# Patient Record
Sex: FEMALE | Race: WHITE | NOT HISPANIC OR LATINO | Employment: OTHER | URBAN - METROPOLITAN AREA
[De-identification: names, ages, dates, MRNs, and addresses within clinical notes are randomized per-mention and may not be internally consistent; named-entity substitution may affect disease eponyms.]

---

## 2019-10-04 NOTE — TELEPHONE ENCOUNTER
RECORDS RECEIVED FROM: External - Dr. Dalton Rinaldi at [facility]    DATE RECEIVED: 1/17/20   NOTES (FOR ALL VISITS) STATUS DETAILS   OFFICE NOTE from referring provider Care Everywhere 9/20/19  6/17/19  3/20/19  (additional encounters in Care Everywhere)   OFFICE NOTE from other specialist N/A    DISCHARGE SUMMARY from hospital N/A    DISCHARGE REPORT from the ER N/A    OPERATIVE REPORT N/A    MEDICATION LIST Care Everywhere    IMAGING  (FOR ALL VISITS)     EMG N/A    EEG N/A    ECT N/A    MRI (HEAD, NECK, SPINE) N/A    LUMBAR PUNCTURE N/A    AMY Scan N/A    CT (HEAD, NECK, SPINE) N/A

## 2019-11-18 ENCOUNTER — DOCUMENTATION ONLY (OUTPATIENT)
Dept: CARE COORDINATION | Facility: CLINIC | Age: 59
End: 2019-11-18

## 2020-01-17 ENCOUNTER — PRE VISIT (OUTPATIENT)
Dept: NEUROLOGY | Facility: CLINIC | Age: 60
End: 2020-01-17

## 2020-07-17 ENCOUNTER — OFFICE VISIT (OUTPATIENT)
Dept: NEUROLOGY | Facility: CLINIC | Age: 60
End: 2020-07-17
Payer: COMMERCIAL

## 2020-07-17 VITALS
SYSTOLIC BLOOD PRESSURE: 122 MMHG | DIASTOLIC BLOOD PRESSURE: 75 MMHG | HEIGHT: 64 IN | OXYGEN SATURATION: 97 % | BODY MASS INDEX: 22.42 KG/M2 | WEIGHT: 131.3 LBS | HEART RATE: 60 BPM | RESPIRATION RATE: 16 BRPM

## 2020-07-17 DIAGNOSIS — R25.1 TREMOR: Primary | ICD-10-CM

## 2020-07-17 RX ORDER — PROPRANOLOL HYDROCHLORIDE 120 MG/1
240 CAPSULE, EXTENDED RELEASE ORAL
COMMUNITY
Start: 2019-09-20

## 2020-07-17 ASSESSMENT — ACTIVITIES OF DAILY LIVING (ADL)
WRITING: (3) MODERATELY ABNORMAL. CANNOT WRITE WITHOUT USING STRATEGIES SUCH AS HOLDING THE WRITING HAND WITH THE OTHER HAND, HOLDING PEN DIFFERENTLY OR USING LARGE PEN.
DRESS: (0) NORMAL
SOCIAL_IMPACT: (4) AVOIDS PARTICIPATING IN MOST SOCIAL SITUATIONS OR PROFESSIONAL MEETINGS BECAUSE OF TREMOR.
WORKING: (3) MODERATELY ABNORMAL. UNABLE TO CONTINUE WORKING WITHOUT USING STRATEGIES SUCH AS CHANGING JOBS OR USING SPECIAL EQUIPMENT.
POURING: (0) NORMAL
SPEAKING: (0) NORMAL
CARRYING_FOOD_TRAYS_PLATES_OR_SIMILAR_ITEMS: (3) MODERATELY ABNORMAL. USES STRATEGIES SUCH AS HOLDING TIGHTLY AGAINST BODY TO CARRY.
HYGIENE: (3) MODERATELY ABNORMAL. UNABLE TO DO MOST FINE TASKS SUCH AS PUTTING ON LIPSTICK OR SHAVING UNLESS CHANGES STRATEGY SUCH AS USING TWO HANDS OR USING THE LESS AFFECTED HAND.
OVERALL_DISABILITY_WITH_THE_MOST_AFFECTED_TASK: (3) MODERATELY ABNORMAL. CAN DO TASK BUT MUST USE STRATEGIES.
OVERALL_DISABILITY_WITH_THE_MOST_AFFECTED_TASK: TYPING
FEEDING_WITH_A_SPOON: (0) NORMAL
DRINKING_FROM_A_GLASS: (0) NORMAL
USING_KEYS: (0) NORMAL

## 2020-07-17 ASSESSMENT — MIFFLIN-ST. JEOR: SCORE: 1150.57

## 2020-07-17 ASSESSMENT — PAIN SCALES - GENERAL: PAINLEVEL: NO PAIN (0)

## 2020-07-17 NOTE — PATIENT INSTRUCTIONS
We discussed about the character of your tremor and the implications on the treatment .    We noted that you have dystonic tremor . Dystonia is a symptom and is very common in adult population especially when it starts in the upper part of the body ( head and right hand).    We recommend botulinum toxin injections for dystonia which selectively weakness the muscles , botulinum toxin injections can be effective in the treatment of dystonia.    Oral medications are not very promising due to side effects.    Another treatment option is brain surgery- deep brain stimulation.    We recommend trying conservative treatment first before making any suggestions about deep brain stimulation.    You agreed to proceed with botulinum toxin injections, will refer to be seen and evaluated by Dr. Santoyo.

## 2020-07-17 NOTE — NURSING NOTE
Chief Complaint   Patient presents with     DBS Evaluation     [facility] DEEP BRAIN STIMULATION CONSULT        Roderick Landry, EMT

## 2020-07-17 NOTE — PROGRESS NOTES
Department of Neurology  Movement Disorders Division     Patient: Concha Chowdhury   MRN: [id number]   : 1960   Date of Visit: 2020     Referring Physician: LUIS FERNANDO Murrell    Dear Colleague,     Thank you for referring your patient, Ms. Chowdhury, to the [facility] NEUROLOGY at [facility]. Please see a copy of my visit note below.    Reason for visit: tremors     History of Present Illness  Ms. Chowdhury is a 60 year old R handed female with a 5-10 year history of head tremor and right hand tremor.  Head tremor started 5-10 years ago after lifting a heavy box, neck evaluation with C3-C4 bulging disc , then a few years later she noted right hand tremor and she is disabled by the hand tremor that is  getting progressively worse . She states Propranolol is helping with the head tremor and she is pleased with the head tremor control.     She is a  and feels disabled by the right hand  tremor, unable to type, handwrite. She has difficulty while using an eye liner, applying make up, eye brow plucking , using a knife.  She is denying voice change, no gait changes, no memory changes . She is not consuming alcohol , so unable to identify if etoh use has any implications on the tremor.    She is denying resting tremor, she does not have difficulty using a fork, does not eat soup , no difficulty drinking from a cup.     Meds tried: nadolol, acetazolamide, propranolol LA , primidone (unable  to recall SE) .Acetazolamide was working a little bit ,then not working anymore , unable to recall SE .    PMH: Crohns, Depression   Surgery Hx: cataract   Social Hx: no drinking, , no drugs , smoke- 1 PPD for 18 years   FHx: mother - tremor , did not go to the doctor, mother had an action tremor, concha does not have children , she has 2 living bro and 2 living sisters -with  no tremors .    Review of Systems:  Other than that mentioned above, the remainder of 12 systems  reviewed were negative.  .  TRG Essential Tremor Rating Assessment Scale (TETRAS)    Activities of Daily Living Subscale:  1.  Speakin   2.  Feeding with a spoon: 0   3.  Drinking from a glass: 0   4.  Hygiene:make up , plucking eye brows,  Had to rest hand on something and use the other hand  3   5.  Dressin   6.  Pourin   7.  Carrying food trays, plates, or similar items:carrying coffee cups  3   8.  Using Keys: 0   9.  Writing:tremulous , has to stabilize with the other hand  3   10.  Working:   3   11.  Overall disability with most affected task:    Task:  Typing  3   12.  Social Impact: pretty much stays home, noted depression  4     Total for ADL Subscale:   (48 Max) 19     Tremor ADL Scale (from flowsheet)  1. Speaking 0   2. Feeding with a spoon 0   3. Drinking from a glass 0   4. Hygiene 3(Plucking eyebrows rests hand on s/thing, uses other hand.)   5. Dressing 0   6. Pouring 0   7. Carrying food trays, plates or similar items 3   8. Using keys 0   9. Writing 3   10. Working 3   11. Overall disability with the most affected task 3   Name of most affected task Typing   12. Social impact 4   ADL TOTAL           Medications:  Current Outpatient Medications   Medication Sig Dispense Refill     Calcium Carbonate-Vitamin D (CALCIUM + D) 600-200 MG-UNIT per tablet Take 2 tablets by mouth daily.       Cholecalciferol (VITAMIN D3) 1000 UNIT TABS Take  by mouth daily.       cyanocolbalamin (VITAMIN B-12) 500 MCG tablet Take 500 mcg by mouth daily.       sertraline (ZOLOFT) 100 MG tablet Take 100 mg by mouth daily. 1 and 1/2 tablets (150 mg total) daily             Movement Disorder-related Medications                   8 am       Propranolol  mg  2       Zoloft 100 mg    1                         Last taken at:  yesterday at 8 am .           Allergies: is allergic to naprosyn [naproxen] and penicillin g.    Past Medical History:   Past Medical History:   Diagnosis Date     Ringing in left  ear        Past Surgical History:   Past Surgical History:   Procedure Laterality Date     ECTOPIC PREGNANCY SURGERY       EXTRACAPSULAR CATARACT EXTRATION WITH INTRAOCULAR LENS IMPLANT      bilateral     HERNIA REPAIR       MYRINGOTOMY, INSERT TUBE, COMBINED  6/2/2011    Procedure:COMBINED MYRINGOTOMY, INSERT TUBE; Surgeon:ARYAN SANTOS; Location:[facility] OR     RECESSION RESECTION (REPAIR STRABISMUS) BILATERAL      2 or 3 surgeries     STAPEDECTOMY  6/2/2011    Procedure:STAPEDECTOMY; Surgeon:ARYAN SANTOS; Location:[facility] OR       Social History:   Social History     Socioeconomic History     Marital status:      Spouse name: Not on file     Number of children: Not on file     Years of education: Not on file     Highest education level: Not on file   Occupational History     Not on file   Social Needs     Financial resource strain: Not on file     Food insecurity     Worry: Not on file     Inability: Not on file     Transportation needs     Medical: Not on file     Non-medical: Not on file   Tobacco Use     Smoking status: Current Every Day Smoker     Packs/day: 1.00   Substance and Sexual Activity     Alcohol use: No     Drug use: No     Sexual activity: Not on file   Lifestyle     Physical activity     Days per week: Not on file     Minutes per session: Not on file     Stress: Not on file   Relationships     Social connections     Talks on phone: Not on file     Gets together: Not on file     Attends [employer] service: Not on file     Active member of club or organization: Not on file     Attends meetings of clubs or organizations: Not on file     Relationship status: Not on file     Intimate partner violence     Fear of current or ex partner: Not on file     Emotionally abused: Not on file     Physically abused: Not on file     Forced sexual activity: Not on file   Other Topics Concern     Not on file   Social History Narrative     Not on file       Family History:  No family history on  "file.      Physical Exam:  /75   Pulse 60   Resp 16   Ht 1.626 m (5' 4\")   Wt 59.6 kg (131 lb 4.8 oz)   SpO2 97%   BMI 22.54 kg/m     She is alert and oriented, speech is clear, no voice tremor . Follows directions. Registered and recalled 3/3 words . No agraphesthesia.   EOMI in all directions of gaze, face is symmetric at rest and with equal activation.  Neck : left latero antonio ~ 10-20 degrees, yes-yes head tremor.  Muscle tone is normal in all extremities. No resting tremor.   There is no pronator drift. There is right hand posturing with flexed fingers  with arms outstretched .  There is postural tremor with the right hand in the 45 degrees pronation, barely visible BL tremor in wing beating position, there is BL tremor with FTN at the end of the work space.  Interruptions with finger tapping with no decrease in amplitude.     Motor (Performance) Sub-Scale 7/17/2020   Assessment Time 8:38 AM   Medication Off   DBS - Right Brain None   DBS - Left Brain None   Head 1.5   Face & Jaw 0   Voice 1   Outstretched - RIGHT 1.5   Outstretched - LEFT 1   Wingbeating - RIGHT 1   Wingbeating - LEFT 1   Kinetic - RIGHT 1.5   Kinetic - LEFT 1.5   Lower Limb - RIGHT 0   Lower Limb - LEFT 0   Lower Limb (Max) 0   Spiral - RIGHT 3   Spiral - LEFT 1.5   Handwriting 2   Dot approx - RIGHT 1.5   Dot approx - LEFT 1   Trunk (Standing) 0   Total Right 8.5   Total Left 6   Axial 2.5   TOTAL 19     Writing: there is wrist extension and right thumb extension while writing.  Samples of handwriting: right hand with \"geste antagoniste\" .  She has less posturing while holding the pen in the\" awkward positions\" that we asked her to:     Quiet asymmetrialc and tremulous spiral with the right hand. She became very emotional while drawing the right spiral.    Gait: unremarkable , except with decreased arm swing on the right       Impression:  Concha Chowdhury is a 60 year old female, ,  with a 5-10 year history of head " and bilateral hands tremor R>L. She is disabled but he right hand tremor not being able to type, write , use a knife.   Exam with mild latero antonio, dystonic yes-yes head tremor and right hand with posturing while writing.     1. Cervical dystonia with dystonic tremor.  2. Right hand dystonic tremor.     Recommendations:     We discussed about the character of your tremor and the implications on the treatment .    We noted that you have dystonic tremor . Dystonia is a symptom and is very common in adult population especially when it starts in the upper part of the body ( head and right hand).    We recommend botulinum toxin injections for dystonia which selectively weakness the muscles , botulinum toxin injections can be effective in the treatment of dystonia.    Oral medications are not very promising due to side effects.    Another treatment option is brain surgery- deep brain stimulation.    We recommend trying conservative treatment first before making any suggestions about deep brain stimulation.    You agreed to proceed with botulinum toxin injections, will refer to be seen and evaluated by Dr. Santoyo.      Orly Ford MD  Movement Disorders Fellow    Patient seen and examined with Dr. Ford and I agree with the assessment and plan as outlined.  Briefly:  59 yo RHF, wanting further treatment for tremor.  Onset several years ago of head tremor, following a neck injury (lifting heavy load, cervical disk bulge, residual pain).  Improved with propanolol, but recently has been worsening.  Also recently (past few years) has developed a RUE tremor which is more bothersome, and more disabling than the head tremor.  Interferes quite a lot with writing (very problematic, as she's a ), typing, and cosmetics (plucking eyebrows), somewhat with eating. (Movements described by her as jerky, occur when loading the fork, but getting fork to mouth is not particularly difficult.  She does not have occasion to try  soup with a spoon).  Doesn't drink EtOH.  Mother reportedly had tremor, thought to have been PD.  On exam, spontaneous and voluntary facial movements unremarkable,  mild, but convincing L head tilt, mild nodding head tremor, more in midposition than with left or right rotation.  No true resting tremor, but with hand in lap, but semisupinated, and with reinforcement, a little distal RUE tremor was seen.  Very little postural tremor in either hand, with outstreatche or in wingbeating position, though there was a moderate kinetic tremor R>L UE more conspicuous at the edge of the workspace.  Left dot-approximation and spiral were good, but with RUE very impaired, dot approximation had a slightly jerky quality, but the spiral was definitely tremulous (seemed mostly wrist pron/ation supination, or possibly wrist flexion/extension).  Writing in her usual way (right hand robb , her  was tight, and there appeared to be some posturing (wrist extension, thumb extension, at both joints).  She then demonstrated what may be a geste antagoniste, resting 2nd & 3rd left hand fingertips on the right thenar eminence & volar wrist, and with this the handwriting was a bit improved, the posturing more convincingly so.  We tried writing with several other types of , and results were ambiguous, though I do think the power  was better (sloppy handwriting, but looser, and with less posturing).  Impression / Discussion / Plan:  While elements of ET are present, there appears to be a dystonic component to her tremor, which opens up a possible therapy, namely botulinum toxin injections.  Surgery (likely DBS) is also a consideration;  we discussed this, and both of us agreed on a plan to try botulinum toxin first, consider DBS if that's unsuccessful.    Allan Desir PhD, MD

## 2020-09-23 NOTE — TELEPHONE ENCOUNTER
RECORDS RECEIVED FROM: Internal   Date of Appt: 11.10.20   NOTES (FOR ALL VISITS) STATUS DETAILS   OFFICE NOTE from referring provider Internal 7.17.20 [facility] Neuro   OFFICE NOTE from other specialist Internal María Olivares  9.20.19  6.17.19  3.20.19   DISCHARGE SUMMARY from hospital N/A    DISCHARGE REPORT from the ER N/A    OPERATIVE REPORT N/A    MEDICATION LIST Internal    IMAGING  (FOR ALL VISITS)     EMG N/A    EEG N/A    LUMBAR PUNCTURE N/A    AMY SCAN N/A    DEXA SCAN *NEUROSURGERY* N/A    ULTRASOUND (CAROTID BILAT) *VASCULAR* N/A    MRI (HEAD, NECK, SPINE) N/A    XRAY (SPINE) *NEUROSURGERY* N/A    CT (HEAD, NECK, SPINE) N/A

## 2020-11-10 ENCOUNTER — OFFICE VISIT (OUTPATIENT)
Dept: NEUROLOGY | Facility: CLINIC | Age: 60
End: 2020-11-10
Payer: COMMERCIAL

## 2020-11-10 ENCOUNTER — PRE VISIT (OUTPATIENT)
Dept: NEUROLOGY | Facility: CLINIC | Age: 60
End: 2020-11-10

## 2020-11-10 VITALS
OXYGEN SATURATION: 98 % | BODY MASS INDEX: 22.66 KG/M2 | WEIGHT: 132 LBS | HEART RATE: 63 BPM | SYSTOLIC BLOOD PRESSURE: 147 MMHG | DIASTOLIC BLOOD PRESSURE: 70 MMHG

## 2020-11-10 DIAGNOSIS — R25.1 TREMOR: Primary | ICD-10-CM

## 2020-11-10 PROCEDURE — 99204 OFFICE O/P NEW MOD 45 MIN: CPT | Performed by: PSYCHIATRY & NEUROLOGY

## 2020-11-10 RX ORDER — CLINDAMYCIN HCL 300 MG
300 CAPSULE ORAL 3 TIMES DAILY
COMMUNITY

## 2020-11-10 NOTE — PATIENT INSTRUCTIONS
1.  You have cervical dystonia.  This means muscles are bailey in your neck involuntarily  2.  We can lessen your head tremor by injecting botulinum toxin into neck muscles.  This can cut down your head tremor for 3 months  3.  We have to have the injections pre-approved so you won't be charged for the entire injection and drug  4.  Possible side effects are head weakness and swallowing problems  5.  If the injections work they will have to be repeated every 3 months.  I don't know of anyone doing these in [place]  6.  We can also try injections for your right hand tremor, but these are less reliable  7.  Please call if you want us to pre-approve the head or hand injections

## 2020-11-10 NOTE — LETTER
11/10/2020       RE: Concha Chowdhury  [address] [place] [id number]     Dear Colleague,    Thank you for referring your patient, Concha Chowdhury, to the [facility] NEUROLOGY CL[facility] [place] at [facility]. Please see a copy of my visit note below.    Department of Neurology  Movement Disorders Division   Initial Clinic Evaluation     Patient: Concha Chowdhury   MRN: [id number]   : 1960   Date of Visit: 11/10/2020     Referring Physician: Thang    Reason for Referral: Dystonic tremor    Impression:  1.  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  Right arm tremor with features of essential tremor and dystonia    Recommendations:  1.  I discussed botulinum toxin treatment with the patient.  There is good evidence that botulinum toxin can help the dystonic head tremor.  In fact it is the treatment of choice for this.  I discussed the approach the possible side effects and expectations.  2.  Botulinum toxin for upper extremity tremor is less proven.  There was a recent article by Geovanna and [facility] outlining the approach to upper extremity essential tremor with botulinum toxin.  The idea was to examine many muscles and tailor an injection pattern with small injections in many muscles.  I told her I would happy be happy to give this a try.  3.  Either the hand or arm tremor injections are elective and she can decide to go ahead.  I discussed how either injection would have to be preapproved.  Preapproval may be more difficult for the arm tremor.    Please call or write with questions or concerns,    Sincerely yours,    Dany Santoyo MD , MD    History of Present Illness  Ms. Chowdhury is a 60 year old female who is right-handed.  She comes from [facility] near [place].  She works [employer] as an .  She is referred by Dr. Allan Desir for evaluation of botulinum toxin injections for her arm and possibly  head tremor.    The patient states that her head tremor began 8 years ago.  She was lifting a heavy box of books at the school.  She felt some sudden pain in her neck.  She was found to have a cervical disc protrusion.  At the same time her head started to shake.  He continues to shake.  She continues to have some neck pain in the posterior cervical region.  She herself has not noticed head deviation or head pulling.  She has not noticed on no point or a chest.  Her head does feel heavy.  The head shakes back-and-forth.  It is very noticeable in public.  It embarrasses her greatly.    About a year or 2 later she began to notice hand tremor.  It affects both hands but the right much worse than the left.  This is all a posture with action or kinetic activities.  She notices it when she plucks her eyebrows or puts on eyeliner.  She notices it brushing her teeth.  She noticed that using her fork although she can still eat with it.  She notices it typing.  She notices it with handwriting.  She also notices it with drinking from a cup.  She carries things with 2 hands but still can drink with 1 and from a cup.  She has no resting tremor.  She has no symptoms of parkinsonism.    She says propranolol has helped with her head tremor but not her hand tremor.  She was on acetazolamide which helped her hand tremor for time but the effects wore off.  She was on primidone but it caused some unknown side effect.    Her mother had hand and hand tremor.  She has 4 siblings with no tremor.  She has no children.  She does not drink so does not recognize an alcohol effect.    I read through the comprehensive note of Dr. Desir and Dr. Ford.  They recorded an action tremor worse in the right arm and some head tremor with left laterocollis.  The patient had a tremor disability score of 19 out of 48.  She had a Tetris score of 19 which is mild to moderate tremor.    They concluded that the patient had cervical dystonia with dystonic head  tremor.  They felt the right hand tremor was dystonic as well with features of essential tremor and suggested a trial of botulinum toxin.  Deep brain stimulation was also discussed.      Past Medical History:   Past Medical History:   Diagnosis Date     Ringing in left ear        Past Surgical History:   Past Surgical History:   Procedure Laterality Date     ECTOPIC PREGNANCY SURGERY       EXTRACAPSULAR CATARACT EXTRATION WITH INTRAOCULAR LENS IMPLANT      bilateral     HERNIA REPAIR       MYRINGOTOMY, INSERT TUBE, COMBINED  6/2/2011    Procedure:COMBINED MYRINGOTOMY, INSERT TUBE; Surgeon:ARYAN SANTOS; [facility]     RECESSION RESECTION (REPAIR STRABISMUS) BILATERAL      2 or 3 surgeries     STAPEDECTOMY  6/2/2011    Procedure:STAPEDECTOMY; Surgeon:ARYAN SANTOS; Location[facility] OR       Medications:  Current Outpatient Medications   Medication Sig Dispense Refill     clindamycin (CLEOCIN) 300 MG capsule Take 300 mg by mouth 3 times daily       HEMP OIL OR EXTRACT OR OTHER CBD CANNABINOID, NOT MEDICAL CANNABIS, 25MG CBD CAPSULE       propranolol ER (INDERAL LA) 120 MG 24 hr capsule Take 240 mg by mouth       sertraline (ZOLOFT) 100 MG tablet Take 100 mg by mouth daily 1 daily       Calcium Carbonate-Vitamin D (CALCIUM + D) 600-200 MG-UNIT per tablet Take 2 tablets by mouth daily.       Cholecalciferol (VITAMIN D3) 1000 UNIT TABS Take  by mouth daily.       cyanocolbalamin (VITAMIN B-12) 500 MCG tablet Take 500 mcg by mouth daily.          Movement Disorder-related Medications                                                                                                                                                             Allergies: is allergic to naprosyn [naproxen] and penicillin g.    Social History:   Social History     Socioeconomic History     Marital status:      Spouse name: Not on file     Number of children: Not on file     Years of education: Not on file     Highest education  level: Not on file   Occupational History     Not on file   Social Needs     Financial resource strain: Not on file     Food insecurity     Worry: Not on file     Inability: Not on file     Transportation needs     Medical: Not on file     Non-medical: Not on file   Tobacco Use     Smoking status: Current Every Day Smoker     Packs/day: 1.00     Smokeless tobacco: Never Used   Substance and Sexual Activity     Alcohol use: No     Drug use: No     Sexual activity: Not on file   Lifestyle     Physical activity     Days per week: Not on file     Minutes per session: Not on file     Stress: Not on file   Relationships     Social connections     Talks on phone: Not on file     Gets together: Not on file     Attends Orthodoxy service: Not on file     Active member of club or organization: Not on file     Attends meetings of clubs or organizations: Not on file     Relationship status: Not on file     Intimate partner violence     Fear of current or ex partner: Not on file     Emotionally abused: Not on file     Physically abused: Not on file     Forced sexual activity: Not on file   Other Topics Concern     Not on file   Social History Narrative     Not on file       Family History:  No family history on file.    ROS:    Neurologic  Visual loss: no, Double vision: no, Headache: no, Loss of consciousness:  no, Seizure: no, Fainting (syncope): no, Dysarthria: no, Dysphagia:  no, Vertigo:  no, Weakness: no, Atrophy: no, Twitches: no, Lhermitte's: no, Numbness or tingling: no, Handwriting change: YES, Tremors: YES, Involuntary movements: YES, Imbalance: no, Abnormal gait:  no, Falls :no, Memory loss: no, RBD: no, Sleep disorder: no, Hallucination: no, Loss of concentration: no,  Behavioral change: no,  Loss of motivation: no    Comprehensive Neurologic Exam    Mental Status Exam   The patient is alert, oriented and exhibits no difficulty with cognition or memory.  A formal short test of mental status was performed.      Neurovascular         Speech and Language   Right Left     Carotid Bruit Absent Absent  Speech is normal.   Dorsalis Pedis Pulse Normal Normal  Description   Posterior Tibial Pulse Normal Normal  Language is normal.     Cranial Nerve Exam                 Right Left   Right Left   II                                        Normal Normal  Hearing (VIII) Normal Normal      III, IV, V!                   Normal Normal  Nystagmus (VIII) Normal Normal   EOM description                              Gag (IX, X) Normal Normal   Facial Sensation (V) Normal Normal  SCM (XI) Normal Normal   Muscles of Mastication (V) Normal Normal  Trapezius (XI) Normal Normal   Facial Strength (VII) Normal Normal  Tongue (XII) Normal Normal     Motor Exam  Strength (*/5 grading)  Muscle                  Right Left  Muscle Right Left   Frontalis                                           Normal Normal  Iliopsoas Normal    Normal          Orbicularis Oculi                     Normal Normal  Quadrideps Normal    Normal        Orbicularis Oris                         Normal Normal  Anterior Tibial Normal Normal      Deltoid Normal Normal  Gastrocnemius Normal    Normal   Biceps Normal Normal  Extensor Hallucis Longus Normal    Normal   Triceps Normal Normal  Toe Extensors Normal    Normal   EDC Normal Normal  Toe Flexor Normal    Normal   Finger Flexors Normal Normal  Other Normal Normal   First Dorsal Interosseous Normal Normal  Other Normal Normal   Hypothenar Normal Normal  Other Normal Normal   Thenar Normal Normal  Other Normal Normal     Reflexes      Tone   Right Left   Right Left   Biceps Normal Normal  Spasticity (S)  Rigidity (R)     Triceps Normal Normal  Neck     Brachioradialis Normal Normal  Arm     Quadriceps Normal Normal  Leg     Ankle Normal Normal       Babkinski Flexor Flexor         AMR       Coordination   Right Left   Right Left   Fingers Normal Normal  Finger nose finger Normal Normal   Hand Normal Normal  Drift Normal  Normal   Leg Normal Normal  Heel Kothari Normal Normal   Foot Normal Normal  Other     Other             Involuntary Movements    Gait and Station  None            Right Left  Stand & Sit Normal   Postural arm tremor +1 +1  Gait Normal   (Movement type) Normal Normal  Tandem Normal   (Movement type) Normal Normal  Romberg Absent   Please see previous note by Dr. Ford and Dr. Desir for tremor ratings.  The patient has tremor predominantly with writing with the right hand.  Her right hand handwriting is illegible and her spiral is abnormal 3.  Her Dot approximation is +2.    Sensory Exam          Right Left    Pin Normal Normal    Vibration Normal Normal    Joint position Normal Normal    Other          Coding statement:   Duration of  Services: face-to-face 60 min.   Greater than 50% of this visit was spent in counseling and coordination of care.    Dany Santoyo MD

## 2020-11-10 NOTE — PROGRESS NOTES
Department of Neurology  Movement Disorders Division   Initial Clinic Evaluation     Patient: Concha Chowdhury   MRN: [id number]   : 1960   Date of Visit: 11/10/2020     Referring Physician: Thang    Reason for Referral: Dystonic tremor    Impression:  1.  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  Right arm tremor with features of essential tremor and dystonia    Recommendations:  1.  I discussed botulinum toxin treatment with the patient.  There is good evidence that botulinum toxin can help the dystonic head tremor.  In fact it is the treatment of choice for this.  I discussed the approach the possible side effects and expectations.  2.  Botulinum toxin for upper extremity tremor is less proven.  There was a recent article by Geovanna and [facility] Proceedings outlining the approach to upper extremity essential tremor with botulinum toxin.  The idea was to examine many muscles and tailor an injection pattern with small injections in many muscles.  I told her I would happy be happy to give this a try.  3.  Either the hand or arm tremor injections are elective and she can decide to go ahead.  I discussed how either injection would have to be preapproved.  Preapproval may be more difficult for the arm tremor.    Please call or write with questions or concerns,    Sincerely yours,    Dany Santoyo MD , MD      History of Present Illness  Ms. Chowdhury is a 60 year old female who is right-handed.  She comes from [facility] near [place].  She works [employer] as an .  She is referred by Dr. Allan Desir for evaluation of botulinum toxin injections for her arm and possibly head tremor.    The patient states that her head tremor began 8 years ago.  She was lifting a heavy box of books at the school.  She felt some sudden pain in her neck.  She was found to have a cervical disc protrusion.  At the same time her head started to shake.  He continues to shake.  She continues to have  some neck pain in the posterior cervical region.  She herself has not noticed head deviation or head pulling.  She has not noticed on no point or a chest.  Her head does feel heavy.  The head shakes back-and-forth.  It is very noticeable in public.  It embarrasses her greatly.    About a year or 2 later she began to notice hand tremor.  It affects both hands but the right much worse than the left.  This is all a posture with action or kinetic activities.  She notices it when she plucks her eyebrows or puts on eyeliner.  She notices it brushing her teeth.  She noticed that using her fork although she can still eat with it.  She notices it typing.  She notices it with handwriting.  She also notices it with drinking from a cup.  She carries things with 2 hands but still can drink with 1 and from a cup.  She has no resting tremor.  She has no symptoms of parkinsonism.    She says propranolol has helped with her head tremor but not her hand tremor.  She was on acetazolamide which helped her hand tremor for time but the effects wore off.  She was on primidone but it caused some unknown side effect.    Her mother had hand and hand tremor.  She has 4 siblings with no tremor.  She has no children.  She does not drink so does not recognize an alcohol effect.    I read through the comprehensive note of Dr. Desir and Dr. Ford.  They recorded an action tremor worse in the right arm and some head tremor with left laterocollis.  The patient had a tremor disability score of 19 out of 48.  She had a Tetris score of 19 which is mild to moderate tremor.    They concluded that the patient had cervical dystonia with dystonic head tremor.  They felt the right hand tremor was dystonic as well with features of essential tremor and suggested a trial of botulinum toxin.  Deep brain stimulation was also discussed.          Past Medical History:   Past Medical History:   Diagnosis Date     Ringing in left ear        Past Surgical History:    Past Surgical History:   Procedure Laterality Date     ECTOPIC PREGNANCY SURGERY       EXTRACAPSULAR CATARACT EXTRATION WITH INTRAOCULAR LENS IMPLANT      bilateral     HERNIA REPAIR       MYRINGOTOMY, INSERT TUBE, COMBINED  6/2/2011    Procedure:COMBINED MYRINGOTOMY, INSERT TUBE; Surgeon:ARYAN SANTOS; [facility]     RECESSION RESECTION (REPAIR STRABISMUS) BILATERAL      2 or 3 surgeries     STAPEDECTOMY  6/2/2011    Procedure:STAPEDECTOMY; Surgeon:ARYAN SANTOS; Location[facility]       Medications:  Current Outpatient Medications   Medication Sig Dispense Refill     clindamycin (CLEOCIN) 300 MG capsule Take 300 mg by mouth 3 times daily       HEMP OIL OR EXTRACT OR OTHER CBD CANNABINOID, NOT MEDICAL CANNABIS, 25MG CBD CAPSULE       propranolol ER (INDERAL LA) 120 MG 24 hr capsule Take 240 mg by mouth       sertraline (ZOLOFT) 100 MG tablet Take 100 mg by mouth daily 1 daily       Calcium Carbonate-Vitamin D (CALCIUM + D) 600-200 MG-UNIT per tablet Take 2 tablets by mouth daily.       Cholecalciferol (VITAMIN D3) 1000 UNIT TABS Take  by mouth daily.       cyanocolbalamin (VITAMIN B-12) 500 MCG tablet Take 500 mcg by mouth daily.            Movement Disorder-related Medications                                                                                                                                                             Allergies: is allergic to naprosyn [naproxen] and penicillin g.    Social History:   Social History     Socioeconomic History     Marital status:      Spouse name: Not on file     Number of children: Not on file     Years of education: Not on file     Highest education level: Not on file   Occupational History     Not on file   Social Needs     Financial resource strain: Not on file     Food insecurity     Worry: Not on file     Inability: Not on file     Transportation needs     Medical: Not on file     Non-medical: Not on file   Tobacco Use     Smoking status: Current  Every Day Smoker     Packs/day: 1.00     Smokeless tobacco: Never Used   Substance and Sexual Activity     Alcohol use: No     Drug use: No     Sexual activity: Not on file   Lifestyle     Physical activity     Days per week: Not on file     Minutes per session: Not on file     Stress: Not on file   Relationships     Social connections     Talks on phone: Not on file     Gets together: Not on file     Attends Oriental orthodox service: Not on file     Active member of club or organization: Not on file     Attends meetings of clubs or organizations: Not on file     Relationship status: Not on file     Intimate partner violence     Fear of current or ex partner: Not on file     Emotionally abused: Not on file     Physically abused: Not on file     Forced sexual activity: Not on file   Other Topics Concern     Not on file   Social History Narrative     Not on file       Family History:  No family history on file.    ROS:    Neurologic  Visual loss: no, Double vision: no, Headache: no, Loss of consciousness:  no, Seizure: no, Fainting (syncope): no, Dysarthria: no, Dysphagia:  no, Vertigo:  no, Weakness: no, Atrophy: no, Twitches: no, Lhermitte's: no, Numbness or tingling: no, Handwriting change: YES, Tremors: YES, Involuntary movements: YES, Imbalance: no, Abnormal gait:  no, Falls :no, Memory loss: no, RBD: no, Sleep disorder: no, Hallucination: no, Loss of concentration: no,  Behavioral change: no,  Loss of motivation: no      Comprehensive Neurologic Exam    Mental Status Exam   The patient is alert, oriented and exhibits no difficulty with cognition or memory.  A formal short test of mental status was performed.     Neurovascular         Speech and Language   Right Left     Carotid Bruit Absent Absent  Speech is normal.   Dorsalis Pedis Pulse Normal Normal  Description   Posterior Tibial Pulse Normal Normal  Language is normal.     Cranial Nerve Exam                 Right Left   Right Left   II                                         Normal Normal  Hearing (VIII) Normal Normal      III, IV, V!                   Normal Normal  Nystagmus (VIII) Normal Normal   EOM description                              Gag (IX, X) Normal Normal   Facial Sensation (V) Normal Normal  SCM (XI) Normal Normal   Muscles of Mastication (V) Normal Normal  Trapezius (XI) Normal Normal   Facial Strength (VII) Normal Normal  Tongue (XII) Normal Normal     Motor Exam  Strength (*/5 grading)  Muscle                  Right Left  Muscle Right Left   Frontalis                                           Normal Normal  Iliopsoas Normal    Normal          Orbicularis Oculi                     Normal Normal  Quadrideps Normal    Normal        Orbicularis Oris                         Normal Normal  Anterior Tibial Normal Normal      Deltoid Normal Normal  Gastrocnemius Normal    Normal   Biceps Normal Normal  Extensor Hallucis Longus Normal    Normal   Triceps Normal Normal  Toe Extensors Normal    Normal   EDC Normal Normal  Toe Flexor Normal    Normal   Finger Flexors Normal Normal  Other Normal Normal   First Dorsal Interosseous Normal Normal  Other Normal Normal   Hypothenar Normal Normal  Other Normal Normal   Thenar Normal Normal  Other Normal Normal     Reflexes      Tone   Right Left   Right Left   Biceps Normal Normal  Spasticity (S)  Rigidity (R)     Triceps Normal Normal  Neck     Brachioradialis Normal Normal  Arm     Quadriceps Normal Normal  Leg     Ankle Normal Normal       Babkinski Flexor Flexor         AMR       Coordination   Right Left   Right Left   Fingers Normal Normal  Finger nose finger Normal Normal   Hand Normal Normal  Drift Normal Normal   Leg Normal Normal  Heel Kothari Normal Normal   Foot Normal Normal  Other     Other               Involuntary Movements    Gait and Station  None            Right Left  Stand & Sit Normal   Postural arm tremor +1 +1  Gait Normal   (Movement type) Normal Normal  Tandem Normal   (Movement type) Normal Normal   Romberg Absent   Please see previous note by Dr. Ford and Dr. Desir for tremor ratings.  The patient has tremor predominantly with writing with the right hand.  Her right hand handwriting is illegible and her spiral is abnormal 3.  Her Dot approximation is +2.    Sensory Exam          Right Left    Pin Normal Normal    Vibration Normal Normal    Joint position Normal Normal    Other             Coding statement:     Duration of  Services: face-to-face 60 min.   Greater than 50% of this visit was spent in counseling and coordination of care.

## 2020-11-10 NOTE — NURSING NOTE
Chief Complaint   Patient presents with     Consult     [facility] NEW - MOVEMENT DISORDER - [facility]

## 2020-12-14 ENCOUNTER — HEALTH MAINTENANCE LETTER (OUTPATIENT)
Age: 60
End: 2020-12-14

## 2021-10-02 ENCOUNTER — HEALTH MAINTENANCE LETTER (OUTPATIENT)
Age: 61
End: 2021-10-02

## 2021-11-27 ENCOUNTER — HEALTH MAINTENANCE LETTER (OUTPATIENT)
Age: 61
End: 2021-11-27

## 2022-01-22 ENCOUNTER — HEALTH MAINTENANCE LETTER (OUTPATIENT)
Age: 62
End: 2022-01-22

## 2022-09-03 ENCOUNTER — HEALTH MAINTENANCE LETTER (OUTPATIENT)
Age: 62
End: 2022-09-03

## 2023-04-23 ENCOUNTER — HEALTH MAINTENANCE LETTER (OUTPATIENT)
Age: 63
End: 2023-04-23

## 2023-12-09 ENCOUNTER — HEALTH MAINTENANCE LETTER (OUTPATIENT)
Age: 63
End: 2023-12-09

## 2024-11-19 ENCOUNTER — TRANSCRIBE ORDERS (OUTPATIENT)
Dept: OTHER | Age: 64
End: 2024-11-19

## 2024-11-19 DIAGNOSIS — G25.0 FAMILIAL TREMOR: Primary | ICD-10-CM

## 2024-12-10 NOTE — TELEPHONE ENCOUNTER
RECORDS RECEIVED FROM: internal   REASON FOR VISIT: Familial tremor    PROVIDER: Dr. Aguilar   DATE OF APPT: 2/28/25   NOTES (FOR ALL VISITS) STATUS DETAILS   OFFICE NOTE from referring provider Internal Dr Ivy Miller @ [facility] Neurology:  11/18/24   OFFICE NOTE from other specialist Internal Dr Santoyo @ [facility] Neuro:  11/10/20    Dr Desir @ [facility] Neuro:  11/10/20   MEDICATION LIST Internal    IMAGING  (FOR ALL VISITS)     MRI (HEAD, NECK, SPINE) N/A    CT (HEAD, NECK, SPINE) N/A

## 2025-01-08 PROBLEM — H90.3 SENSORINEURAL HEARING LOSS (SNHL) OF BOTH EARS: Status: ACTIVE | Noted: 2018-09-05

## 2025-01-08 PROBLEM — L72.0 MILIA: Status: ACTIVE | Noted: 2023-11-08

## 2025-01-08 PROBLEM — F34.1 DYSTHYMIA: Status: ACTIVE | Noted: 2018-03-05

## 2025-01-08 PROBLEM — G24.3 CERVICAL DYSTONIA: Status: ACTIVE | Noted: 2020-11-16

## 2025-01-08 PROBLEM — J44.9 CHRONIC OBSTRUCTIVE PULMONARY DISEASE, UNSPECIFIED COPD TYPE (H): Chronic | Status: ACTIVE | Noted: 2024-11-06

## 2025-01-08 PROBLEM — M25.552 PAIN OF LEFT HIP JOINT: Status: ACTIVE | Noted: 2018-09-05

## 2025-01-08 RX ORDER — ACETAZOLAMIDE 250 MG/1
250 TABLET ORAL DAILY
COMMUNITY
Start: 2023-04-19

## 2025-01-08 RX ORDER — CYCLOBENZAPRINE HCL 10 MG
5-10 TABLET ORAL 2 TIMES DAILY PRN
COMMUNITY
Start: 2024-11-06

## 2025-01-08 RX ORDER — VARENICLINE TARTRATE 1 MG/1
1 TABLET, FILM COATED ORAL 2 TIMES DAILY
COMMUNITY
Start: 2023-03-27

## 2025-01-08 RX ORDER — ALBUTEROL SULFATE 90 UG/1
1-2 INHALANT RESPIRATORY (INHALATION) EVERY 4 HOURS PRN
COMMUNITY
Start: 2023-05-30

## 2025-01-09 ENCOUNTER — TELEPHONE (OUTPATIENT)
Dept: NEUROLOGY | Facility: CLINIC | Age: 65
End: 2025-01-09
Payer: COMMERCIAL

## 2025-01-09 NOTE — PROGRESS NOTES
Chart review only  Seen by Natanael Astorga and Thang  Interested in DBS        Ivy Miller MD - 11/18/2024 1:14 PM CST  Formatting of this note is different from the original.  CONSULTATION NOTE    Patient Name: Lachelle Chowdhury  Address: [address] [place] [id number]  Age:64 year old  Sex: female    Real time video conference was started. This visit is done virtually. The patient is in an exam room at [facility]; the provider is in the office offsite. The nurse is in the office, no one else is present during the call. The patient understands their insurance company will be billed, verbal consent was obtained.    CC:  Chief Complaint  Patient presents with  Consultation  Essential tremor    Visit Diagnosis: G25.0 Familial tremor (primary encounter diagnosis)  Plan : REFERRAL TO NEUROLOGY    HPI:  Ms. Lachelle Chowdhury is a 64-year-old right-handed female who presents today for neurologic evaluation. The patient reports that she first began noticing some tremor in 2002. In 2020, the patient was actually evaluated at the [facility] movement disorders clinic. She was seen twice in 2020. At her last visit, Botox was recommended for her head tremor as well as the right hand tremor. The patient apparently never followed up. Since 2020, both her head tremor and hand tremor has worsened. She now has difficulty typing. She is having more difficulty eating. She currently takes propranolol  mg daily. The patient reports that she has received some benefit to her head tremor but the propranolol has not helped the hand tremor as much. The right hand is worse than the left hand. She does have some issues with balance. She has not had any falls. In the past she has also tried nadolol, acetazolamide, and primidone without any benefit. Her mother also had tremor primarily affecting her hands. She reports no other neurologic symptoms. She denies any other recent illnesses.    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  Restless legs  Tremors of nervous system  head and hand shaking    PAST SURGICAL HISTORY    Past Surgical History:  Procedure Laterality Date  COLONOSCOPY 04/25/2014  data abstraction- Crohn's disease, 10 yrs f/u per Brandy's review of information  EYE SURGERY  replacment of lens  HERNIA REPAIR  umbilical as an infant  STAPEDECTOMY/STAPEDOTOMY 06/2011  STAPEDECTOMY  TONSILLECTOMY WITH ADENOIDECTOMY       Family History  Problem Relation Name Age of Onset  Dementia Mother  AAA (abdominal aortic aneurysm) Father  Dementia Father  Osteopenia Sister  Cardiovascular disease Sister    SOCIAL HISTORY    Social History    Socioeconomic History  Marital status:   Spouse name: Not on file  Number of children: Not on file  Years of education: Not on file  Highest education level: Not on file  Occupational History  Not on file  Tobacco Use  Smoking status: Every Day  Current packs/day: 0.50  Average packs/day: 0.5 packs/day for 0.9 years (0.4 ttl pk-yrs)  Types: Cigarettes  Start date: 2024  Last attempt to quit: 6/6/2023  Smokeless tobacco: Never  Vaping Use  Vaping status: Never Used  Substance and Sexual Activity  Alcohol use: Not Currently  Comment: occ.  Drug use: Never  Sexual activity: Not Currently  Other Topics Concern  Not on file  Social History Narrative  Not on file    Social Drivers of Health    Financial Resource Strain: Not at Risk (4/25/2024)  Financial Resource Strain  Financial Resource Strain: 1  Food Insecurity: Not at Risk (4/25/2024)  Food Insecurity  Food: 1  Transportation Needs: Not at Risk (4/25/2024)  Transportation Needs  Transportation: 1  Physical Activity: Not on File (7/31/2023)  Physical Activity  Physical Activity: 0  Stress: Not on File (7/31/2023)  Stress  Stress: 0  Social Connections: Not on File (9/10/2024)  Social Connections  Connectedness: 0  Housing Stability: Not at Risk  (2024)  Housing Stability  Housin      ROS: Review of Systems  Neurological: Positive for tremors.    VITALS:  /78 (Right Arm, Sitting, Large Adult)  Pulse 78  Temp 98.4  F (36.9  C)  LMP (LMP Unknown)  SpO2 98%  OB Status Postmenopausal  Smoking Status Every Day  Pain Sc 0/10    PHYSICAL EXAM:  Alert. Speech fluent. No aphasia. The patient follows commands appropriately. Fund of knowledge is normal. Remote and recent memory are intact.  EOMI. Facial sensation intact to light touch. The face is symmetric. Hearing is intact to spoken word. The tongue is midline. A mild head tremor is noted.  There is no pronator drift. The patient moves her extremities equally. Strength is at least antigravity in all 4 extremities.  Sensation is intact to light touch.  A mild to moderate intention/postural tremor is noted in the bilateral upper extremities.  Gait normal. There is some difficulty with tandem gait. Romberg negative.    ASSESSMENT/PLAN:  Familial tremor. The patient reports significant worsening in her tremor. She is already maintained on propranolol  mg daily. I would be hesitant to increase the dose much further than this. She has failed primidone, nadolol, and acetazolamide in the past. The patient actually was previously followed in the movement disorders clinic at the [facility]. The patient would be best served continuing to be followed at the [facility] movement disorders clinic. A referral will be made to them. No specific follow-up here is planned.    45 minutes was spent reviewing the electronic medical record, face to face time with Lachelle Chowdhury, counseling and coordinating care with the patient, family and/or caregivers and post visit documentation.    Dr. Ivy Miller MD  Electronically signed by Ivy Miller MD at 2024 12:01 PM PST       Date of Visit: 11/10/2020      Referring Physician: Thang     Reason for Referral: Dystonic tremor      Impression:  1.  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  Right arm tremor with features of essential tremor and dystonia     Recommendations:  1.  I discussed botulinum toxin treatment with the patient.  There is good evidence that botulinum toxin can help the dystonic head tremor.  In fact it is the treatment of choice for this.  I discussed the approach the possible side effects and expectations.  2.  Botulinum toxin for upper extremity tremor is less proven.  There was a recent article by Geovanna and [facility] outlining the approach to upper extremity essential tremor with botulinum toxin.  The idea was to examine many muscles and tailor an injection pattern with small injections in many muscles.  I told her I would happy be happy to give this a try.  3.  Either the hand or arm tremor injections are elective and she can decide to go ahead.  I discussed how either injection would have to be preapproved.  Preapproval may be more difficult for the arm tremor.     Please call or write with questions or concerns,     Sincerely yours,     Dany Santoyo MD , MD        History of Present Illness  Ms. Chowdhury is a 60 year old female who is right-handed.  She comes from [facility] near [place].  She works [employer] as an .  She is referred by Dr. Allan Desir for evaluation of botulinum toxin injections for her arm and possibly head tremor.     The patient states that her head tremor began 8 years ago.  She was lifting a heavy box of books at the school.  She felt some sudden pain in her neck.  She was found to have a cervical disc protrusion.  At the same time her head started to shake.  He continues to shake.  She continues to have some neck pain in the posterior cervical region.  She herself has not noticed head deviation or head pulling.  She has not noticed on no point or a chest.  Her head does feel heavy.  The head shakes back-and-forth.  It is very noticeable in  public.  It embarrasses her greatly.     About a year or 2 later she began to notice hand tremor.  It affects both hands but the right much worse than the left.  This is all a posture with action or kinetic activities.  She notices it when she plucks her eyebrows or puts on eyeliner.  She notices it brushing her teeth.  She noticed that using her fork although she can still eat with it.  She notices it typing.  She notices it with handwriting.  She also notices it with drinking from a cup.  She carries things with 2 hands but still can drink with 1 and from a cup.  She has no resting tremor.  She has no symptoms of parkinsonism.     She says propranolol has helped with her head tremor but not her hand tremor.  She was on acetazolamide which helped her hand tremor for time but the effects wore off.  She was on primidone but it caused some unknown side effect.     Her mother had hand and hand tremor.  She has 4 siblings with no tremor.  She has no children.  She does not drink so does not recognize an alcohol effect.     I read through the comprehensive note of Dr. Desir and Dr. Ford.  They recorded an action tremor worse in the right arm and some head tremor with left laterocollis.  The patient had a tremor disability score of 19 out of 48.  She had a Tetris score of 19 which is mild to moderate tremor.     They concluded that the patient had cervical dystonia with dystonic head tremor.  They felt the right hand tremor was dystonic as well with features of essential tremor and suggested a trial of botulinum toxin.  Deep brain stimulation was also discussed.           Date of Visit: 7/16/2020      Referring Physician: LUIS FERNANDO Murrell     Dear Colleague,     Thank you for referring your patient, Ms. Chowdhury, to the [facility] NEUROLOGY at [facility]. Please see a copy of my visit note below.     Reason for visit: tremors      History of Present Illness  Ms. Chowdhury is a 60 year  old R handed female with a 5-10 year history of head tremor and right hand tremor.  Head tremor started 5-10 years ago after lifting a heavy box, neck evaluation with C3-C4 bulging disc , then a few years later she noted right hand tremor and she is disabled by the hand tremor that is  getting progressively worse . She states Propranolol is helping with the head tremor and she is pleased with the head tremor control.      She is a  and feels disabled by the right hand  tremor, unable to type, handwrite. She has difficulty while using an eye liner, applying make up, eye brow plucking , using a knife.  She is denying voice change, no gait changes, no memory changes . She is not consuming alcohol , so unable to identify if etoh use has any implications on the tremor.     She is denying resting tremor, she does not have difficulty using a fork, does not eat soup , no difficulty drinking from a cup.      Meds tried: nadolol, acetazolamide, propranolol LA , primidone (unable  to recall SE) .Acetazolamide was working a little bit ,then not working anymore , unable to recall SE .     PMH: Crohns, Depression   Surgery Hx: cataract   Social Hx: no drinking, , no drugs , smoke- 1 PPD for 18 years   FHx: mother - tremor , did not go to the doctor, mother had an action tremor, lilli does not have children , she has 2 living bro and 2 living sisters -with  no tremors .     Review of Systems:  Other than that mentioned above, the remainder of 12 systems reviewed were negative.  .  TRG Essential Tremor Rating Assessment Scale (TETRAS)     Activities of Daily Living Subscale:  1.  Speakin   2.  Feeding with a spoon: 0   3.  Drinking from a glass: 0   4.  Hygiene:make up , plucking eye brows,  Had to rest hand on something and use the other hand  3   5.  Dressin   6.  Pourin   7.  Carrying food trays, plates, or similar items:carrying coffee cups  3   8.  Using Keys: 0   9.  Writing:tremulous , has to  stabilize with the other hand  3   10.  Working:   3   11.  Overall disability with most affected task:     Task:  Typing  3   12.  Social Impact: pretty much stays home, noted depression  4      Total for ADL Subscale:   (48 Max) 19      Tremor ADL Scale (from flowsheet)  1. Speaking 0   2. Feeding with a spoon 0   3. Drinking from a glass 0   4. Hygiene 3(Plucking eyebrows rests hand on s/thing, uses other hand.)   5. Dressing 0   6. Pouring 0   7. Carrying food trays, plates or similar items 3   8. Using keys 0   9. Writing 3   10. Working 3   11. Overall disability with the most affected task 3   Name of most affected task Typing   12. Social impact 4   ADL TOTAL              Medications:  Current Outpatient Prescriptions      She is alert and oriented, speech is clear, no voice tremor . Follows directions. Registered and recalled 3/3 words . No agraphesthesia.   EOMI in all directions of gaze, face is symmetric at rest and with equal activation.  Neck : left latero antonio ~ 10-20 degrees, yes-yes head tremor.  Muscle tone is normal in all extremities. No resting tremor.   There is no pronator drift. There is right hand posturing with flexed fingers  with arms outstretched .  There is postural tremor with the right hand in the 45 degrees pronation, barely visible BL tremor in wing beating position, there is BL tremor with FTN at the end of the work space.  Interruptions with finger tapping with no decrease in amplitude.      Motor (Performance) Sub-Scale 7/17/2020   Assessment Time 8:38 AM   Medication Off   DBS - Right Brain None   DBS - Left Brain None   Head 1.5   Face & Jaw 0   Voice 1   Outstretched - RIGHT 1.5   Outstretched - LEFT 1   Wingbeating - RIGHT 1   Wingbeating - LEFT 1   Kinetic - RIGHT 1.5   Kinetic - LEFT 1.5   Lower Limb - RIGHT 0   Lower Limb - LEFT 0   Lower Limb (Max) 0   Spiral - RIGHT 3   Spiral - LEFT 1.5   Handwriting 2   Dot approx - RIGHT 1.5   Dot approx - LEFT 1   Trunk  "(Standing) 0   Total Right 8.5   Total Left 6   Axial 2.5   TOTAL 19      Writing: there is wrist extension and right thumb extension while writing.  Samples of handwriting: right hand with \"geste antagoniste\" .  She has less posturing while holding the pen in the\" awkward positions\" that we asked her to:      Quiet asymmetrialc and tremulous spiral with the right hand. She became very emotional while drawing the right spiral.     Gait: unremarkable , except with decreased arm swing on the right         Impression:  Concha Chowdhury is a 60 year old female, ,  with a 5-10 year history of head and bilateral hands tremor R>L. She is disabled but he right hand tremor not being able to type, write , use a knife.   Exam with mild latero antonio, dystonic yes-yes head tremor and right hand with posturing while writing.      1. Cervical dystonia with dystonic tremor.  2. Right hand dystonic tremor.      Recommendations:      We discussed about the character of your tremor and the implications on the treatment .     We noted that you have dystonic tremor . Dystonia is a symptom and is very common in adult population especially when it starts in the upper part of the body ( head and right hand).     We recommend botulinum toxin injections for dystonia which selectively weakness the muscles , botulinum toxin injections can be effective in the treatment of dystonia.     Oral medications are not very promising due to side effects.     Another treatment option is brain surgery- deep brain stimulation.     We recommend trying conservative treatment first before making any suggestions about deep brain stimulation.     You agreed to proceed with botulinum toxin injections, will refer to be seen and evaluated by Dr. Santoyo.        Orly Ford MD  Movement Disorders Fellow     Patient seen and examined with Dr. Ford and I agree with the assessment and plan as outlined.  Briefly:  59 yo RHF, wanting further treatment for " tremor.  Onset several years ago of head tremor, following a neck injury (lifting heavy load, cervical disk bulge, residual pain).  Improved with propanolol, but recently has been worsening.  Also recently (past few years) has developed a RUE tremor which is more bothersome, and more disabling than the head tremor.  Interferes quite a lot with writing (very problematic, as she's a ), typing, and cosmetics (plucking eyebrows), somewhat with eating. (Movements described by her as jerky, occur when loading the fork, but getting fork to mouth is not particularly difficult.  She does not have occasion to try soup with a spoon).  Doesn't drink EtOH.  Mother reportedly had tremor, thought to have been PD.  On exam, spontaneous and voluntary facial movements unremarkable,  mild, but convincing L head tilt, mild nodding head tremor, more in midposition than with left or right rotation.  No true resting tremor, but with hand in lap, but semisupinated, and with reinforcement, a little distal RUE tremor was seen.  Very little postural tremor in either hand, with outstreatche or in wingbeating position, though there was a moderate kinetic tremor R>L UE more conspicuous at the edge of the workspace.  Left dot-approximation and spiral were good, but with RUE very impaired, dot approximation had a slightly jerky quality, but the spiral was definitely tremulous (seemed mostly wrist pron/ation supination, or possibly wrist flexion/extension).  Writing in her usual way (right hand robb , her  was tight, and there appeared to be some posturing (wrist extension, thumb extension, at both joints).  She then demonstrated what may be a geste antagoniste, resting 2nd & 3rd left hand fingertips on the right thenar eminence & volar wrist, and with this the handwriting was a bit improved, the posturing more convincingly so.  We tried writing with several other types of , and results were ambiguous, though I do think the  power  was better (sloppy handwriting, but looser, and with less posturing).  Impression / Discussion / Plan:  While elements of ET are present, there appears to be a dystonic component to her tremor, which opens up a possible therapy, namely botulinum toxin injections.  Surgery (likely DBS) is also a consideration;  we discussed this, and both of us agreed on a plan to try botulinum toxin first, consider DBS if that's unsuccessful.     Allan Desir PhD, MD             December 10, 2024  Elda Mccall       12/10/24 12:15 PM  Note          RECORDS RECEIVED FROM: internal   REASON FOR VISIT: Familial tremor    PROVIDER: Dr. Aguilar   DATE OF APPT: 2/28/25   NOTES (FOR ALL VISITS) STATUS DETAILS   OFFICE NOTE from referring provider Internal Dr Ivy Miller @ [facility] Neurology:  11/18/24   OFFICE NOTE from other specialist Internal Dr Santoyo @ [facility] Neuro:  11/10/20     Dr Desir @ [facility] Neuro:  11/10/20   MEDICATION LIST Internal     IMAGING  (FOR ALL VISITS)       MRI (HEAD, NECK, SPINE) N/A     CT (HEAD, NECK, SPINE) N/A

## 2025-01-09 NOTE — TELEPHONE ENCOUNTER
Writer left a message for the patient to call back to reschedule her appointment on 2/28/25 with the correct neurologist.

## 2025-01-09 NOTE — LETTER
1/15/2025    Concha Chowdhury  [address] [place] [id number]    Dear Concha,    I have made several attempts to reach you by phone and through a [employer] message. I have tried to reach you to reschedule your appointment that was scheduled on 2/28/25, with the appropriate Neurologist. Our clinic has reviewed the visit that was scheduled on 2/28/25, incorrectly, and would like to get you scheduled with the correct Neurologist.     If you are still interested in scheduling with our Neurology department, please call me back at [phone].    Best Regards,  Carlos Cage CMA  DBS Care Coordinator  [facility] Neurology Clinic  Office phone:  [phone]     [facility]  [facility] NEUROLOGY  [address]  [facility] [address]  Phone: [phone]

## 2025-01-13 NOTE — TELEPHONE ENCOUNTER
Tried to reach the patient she did not answer and the AI  did not give the writer the option to leave a voicemail. Sent a Chelailet message to the patient about rescheduling with another neurologist.

## 2025-01-15 NOTE — TELEPHONE ENCOUNTER
Tried to call the patient at an alternative phone per Dr. Aguilar ([phone]). This phone number is out of service.

## 2025-01-15 NOTE — PROGRESS NOTES
[phone] (Mobile)  [phone] (Home)     [phone] (Home)  [phone] (Mobile)  [email or web address]     [address]  (Home)  [place] [id number]    Former (Jul. 01, 2015 - Jan. 26, 2017):  [address] [facility] (Home)  [place] [address] Lachelle Chowdhury    Former / Aliases:  Lachelle Chowdhury [phone] (Mobile)  [phone] (Home)  [email or web address]     Patient Demographics  Language Race / Ethnicity Marital Status   English (Preferred) White / Not  or       Patient Contacts    Patient Contacts  Contact Name Contact Address Communication Relationship to Patient   Mohsen Chowdhury Unknown [phone] (Home) Spouse, Personal Relationship   Tony Randle Unknown [phone] (Home) Sister, Personal Relationship

## 2025-01-15 NOTE — TELEPHONE ENCOUNTER
The appointment on 2/28/25 with Dr. Aguilar was canceled per clinic review and a letter was sent to the patient.

## 2025-01-15 NOTE — TELEPHONE ENCOUNTER
"Yes, send letter and cancel the appointment. Put \"clinic review innapropriate provider\" as reason.    Thanks,  Roderick"

## 2025-02-28 ENCOUNTER — PRE VISIT (OUTPATIENT)
Dept: NEUROLOGY | Facility: CLINIC | Age: 65
End: 2025-02-28